# Patient Record
Sex: FEMALE | Race: WHITE | NOT HISPANIC OR LATINO | Employment: OTHER | URBAN - METROPOLITAN AREA
[De-identification: names, ages, dates, MRNs, and addresses within clinical notes are randomized per-mention and may not be internally consistent; named-entity substitution may affect disease eponyms.]

---

## 2023-06-08 LAB
7-AMINOCLONAZEPAM: <25 NG/ML
ALPHA-HYDROXYALPRAZOLAM: <25 NG/ML
ALPHA-HYDROXYMIDAZOLAM: <25 NG/ML
ALPRAZOLAM: <25 NG/ML
CHLORDIAZEPOXIDE: <25 NG/ML
CLONAZEPAM: <25 NG/ML
DIAZEPAM: <25 NG/ML
LORAZEPAM: 63 NG/ML
MIDAZOLAM: <25 NG/ML
NORDIAZEPAM: <25 NG/ML
OXAZEPAM: <25 NG/ML
TEMAZEPAM: <25 NG/ML

## 2023-11-20 DIAGNOSIS — F32.3: ICD-10-CM

## 2023-11-20 PROBLEM — H26.003: Status: ACTIVE | Noted: 2018-02-28

## 2023-11-20 PROBLEM — R62.50 DEVELOPMENTAL REGRESSION: Status: ACTIVE | Noted: 2023-01-03

## 2023-11-20 PROBLEM — D75.89 MACROCYTOSIS: Status: ACTIVE | Noted: 2020-02-14

## 2023-11-20 PROBLEM — K59.00 CONSTIPATION: Status: ACTIVE | Noted: 2020-01-09

## 2023-11-20 PROBLEM — E66.811 OBESITY, CLASS I, BMI 30-34.9: Status: ACTIVE | Noted: 2018-05-02

## 2023-11-20 PROBLEM — Q90.9 DOWN SYNDROME, UNSPECIFIED (HHS-HCC): Status: ACTIVE | Noted: 2018-02-27

## 2023-11-20 PROBLEM — N91.1 SECONDARY AMENORRHEA: Status: ACTIVE | Noted: 2023-05-10

## 2023-11-20 PROBLEM — D75.839 THROMBOCYTOSIS: Status: ACTIVE | Noted: 2020-02-14

## 2023-11-20 PROBLEM — F70 MILD INTELLECTUAL DISABILITY: Status: ACTIVE | Noted: 2020-01-09

## 2023-11-20 PROBLEM — E55.9 VITAMIN D DEFICIENCY: Status: ACTIVE | Noted: 2020-01-09

## 2023-11-20 PROBLEM — E66.9 OBESITY, CLASS I, BMI 30-34.9: Status: ACTIVE | Noted: 2018-05-02

## 2023-11-20 PROBLEM — E03.9 ACQUIRED HYPOTHYROIDISM: Status: ACTIVE | Noted: 2021-04-20

## 2023-11-20 RX ORDER — LORAZEPAM 0.5 MG/1
0.5 TABLET ORAL NIGHTLY
Qty: 30 TABLET | Refills: 1 | Status: SHIPPED | OUTPATIENT
Start: 2023-11-20 | End: 2023-12-21 | Stop reason: SDUPTHER

## 2023-11-20 RX ORDER — LEVONORGESTREL AND ETHINYL ESTRADIOL 0.15-0.03
1 KIT ORAL
COMMUNITY
Start: 2023-09-19

## 2023-11-20 RX ORDER — ATORVASTATIN CALCIUM 40 MG/1
40 TABLET, FILM COATED ORAL
COMMUNITY
Start: 2023-10-30

## 2023-11-20 RX ORDER — LORAZEPAM 0.5 MG/1
1 TABLET ORAL DAILY PRN
COMMUNITY
Start: 2019-04-16 | End: 2023-11-20 | Stop reason: SDUPTHER

## 2023-11-20 RX ORDER — DULOXETIN HYDROCHLORIDE 30 MG/1
30 CAPSULE, DELAYED RELEASE ORAL NIGHTLY
Qty: 30 CAPSULE | Refills: 1 | Status: SHIPPED | OUTPATIENT
Start: 2023-11-20 | End: 2023-12-21 | Stop reason: SDUPTHER

## 2023-11-20 RX ORDER — METFORMIN HYDROCHLORIDE 500 MG/1
500 TABLET ORAL
COMMUNITY

## 2023-11-20 RX ORDER — LEVOTHYROXINE SODIUM 100 UG/1
100 TABLET ORAL
COMMUNITY
Start: 2020-11-20

## 2023-11-20 RX ORDER — LURASIDONE HYDROCHLORIDE 40 MG/1
40 TABLET, FILM COATED ORAL NIGHTLY
Qty: 30 TABLET | Refills: 1 | Status: SHIPPED | OUTPATIENT
Start: 2023-11-20 | End: 2023-12-21 | Stop reason: SDUPTHER

## 2023-11-20 RX ORDER — DULOXETIN HYDROCHLORIDE 30 MG/1
30 CAPSULE, DELAYED RELEASE ORAL DAILY
COMMUNITY
End: 2023-11-20 | Stop reason: SDUPTHER

## 2023-11-20 RX ORDER — CEPHALEXIN 500 MG/1
500 CAPSULE ORAL 2 TIMES DAILY
COMMUNITY
Start: 2023-11-20 | End: 2023-11-25

## 2023-11-20 RX ORDER — DULOXETIN HYDROCHLORIDE 60 MG/1
60 CAPSULE, DELAYED RELEASE ORAL DAILY
COMMUNITY
End: 2023-11-20 | Stop reason: SDUPTHER

## 2023-11-20 RX ORDER — LURASIDONE HYDROCHLORIDE 40 MG/1
40 TABLET, FILM COATED ORAL NIGHTLY
COMMUNITY
Start: 2022-12-09 | End: 2023-11-20 | Stop reason: SDUPTHER

## 2023-11-20 RX ORDER — DULOXETIN HYDROCHLORIDE 60 MG/1
60 CAPSULE, DELAYED RELEASE ORAL NIGHTLY
Qty: 30 CAPSULE | Refills: 1 | Status: SHIPPED | OUTPATIENT
Start: 2023-11-20 | End: 2023-12-21 | Stop reason: SDUPTHER

## 2023-11-20 RX ORDER — ASPIRIN 81 MG/1
1 TABLET ORAL NIGHTLY
COMMUNITY
Start: 2017-07-19

## 2023-12-21 ENCOUNTER — TELEMEDICINE (OUTPATIENT)
Dept: BEHAVIORAL HEALTH | Facility: CLINIC | Age: 38
End: 2023-12-21
Payer: MEDICARE

## 2023-12-21 DIAGNOSIS — R62.50 DEVELOPMENTAL REGRESSION: ICD-10-CM

## 2023-12-21 DIAGNOSIS — F70 MILD INTELLECTUAL DISABILITY: ICD-10-CM

## 2023-12-21 DIAGNOSIS — F39 MOOD DISORDER (CMS-HCC): Primary | ICD-10-CM

## 2023-12-21 DIAGNOSIS — Q90.9 DOWN SYNDROME, UNSPECIFIED (HHS-HCC): ICD-10-CM

## 2023-12-21 PROCEDURE — 99215 OFFICE O/P EST HI 40 MIN: CPT | Performed by: PSYCHIATRY & NEUROLOGY

## 2023-12-21 RX ORDER — LURASIDONE HYDROCHLORIDE 40 MG/1
40 TABLET, FILM COATED ORAL NIGHTLY
Qty: 30 TABLET | Refills: 3 | Status: SHIPPED | OUTPATIENT
Start: 2023-12-21 | End: 2024-04-11 | Stop reason: SDUPTHER

## 2023-12-21 RX ORDER — DULOXETIN HYDROCHLORIDE 60 MG/1
60 CAPSULE, DELAYED RELEASE ORAL NIGHTLY
Qty: 30 CAPSULE | Refills: 3 | Status: SHIPPED | OUTPATIENT
Start: 2023-12-21 | End: 2024-04-11 | Stop reason: SDUPTHER

## 2023-12-21 RX ORDER — LORAZEPAM 0.5 MG/1
0.5 TABLET ORAL NIGHTLY
Qty: 30 TABLET | Refills: 2 | Status: SHIPPED | OUTPATIENT
Start: 2023-12-21 | End: 2024-04-04 | Stop reason: SDUPTHER

## 2023-12-21 RX ORDER — DULOXETIN HYDROCHLORIDE 30 MG/1
30 CAPSULE, DELAYED RELEASE ORAL EVERY MORNING
Qty: 30 CAPSULE | Refills: 3 | Status: SHIPPED | OUTPATIENT
Start: 2023-12-21 | End: 2024-04-11 | Stop reason: SDUPTHER

## 2023-12-21 NOTE — PROGRESS NOTES
Outpatient Psychiatry    A HIPAA-compliant interactive audio and video telecommunication system which permits real time communications between the patient (at the originating site) and provider (at the distant site) was utilized to provide this telehealth service.     The patient, family, caregivers and guardian (as appropriate) have provided consent on this date to conduct treatment via this telehealth service.  The patient's identity and physical location were verified at the time of this visit.      Present for appointment: Jessica and caregiver (Jonathon).    SUBJECTIVE    Last visit with me was 9/07/23.  Saw PCP for Medicare AWV on 11/20/23 -- had labs done.  Referred back to Heme/Onc for persisting leukocytosis and thrombocytosis (has appointment 1/02/24).    Jonathon reports some increased problems with moodiness and oppositional behaviors over the past month or so.  She has been more resistant to doing anything at the day program even though she seems to really enjoy going there.  She is often grumpy and agitated in the afternoons regardless of whether or not she goes to day program; Jonathon says mood seems best first thing in the morning.  Sometimes she is sad/tearful, but mostly she is irritable and quick to get upset.  Rarely using any extra as-needed lorazepam during the daytime aside from the standing/scheduled dose she receives every night.  She is also just getting over recent COVID illness, but these mood/behavior changes predated the illness.  She still enjoys going bowling.  They still have an independent provider for a few hours in the afternoons Monday through Thursday.  She still sees mom with some regularity.      Jonathon does not perceive any additional problems with cognitive or functional decline or memory impairment over the past three months.    She does not appear to be internally stimulated or interacting with hallucinations.     She is sleeping very well at night.  She does not appear excessively tired  "during the daytime.      Her weight is pretty stable.  She is often resistant to participating in physical activity.  Jonathon says Jessica is \"highly motivated\" by food and that offering this as a reward is sometimes the only way he can get her to do things.  Still has some occasional problems with bowel accidents versus poor hygiene or clean-up.  She is not taking any laxatives or stool softeners at this time.  She is not having problems with urinary accidents.     She does not seem to have any involuntary movements.  She has a long-standing behavior (which predates use of antipsychotic medications) of repetitively sticking out her tongue which appears voluntary and has not changed from past visits.  She has no tremors or muscle stiffness.  Jonathon reports zero episodes in the past several months of Jessica having any dystonic eye movements or gaze abnormalities.  She does not have any drooling or dysphagia.      She has not had any dizziness, falls, or other gait problems.  She has not had any apparent seizures or spells.     She does not seem to have any vision or hearing impairments.     She does not report any problems with pain, though Jonathon says she rarely ever would complain of pain even if severe.     Review of Systems   Constitutional:  Negative for activity change and fatigue.   HENT:  Negative for drooling and trouble swallowing.    Respiratory:  Negative for cough and choking.    Gastrointestinal:  Negative for blood in stool and constipation.   Genitourinary:  Negative for menstrual problem (suppressed with OCPs).   Musculoskeletal:  Negative for gait problem.   Neurological:  Negative for seizures and syncope.   Psychiatric/Behavioral:  Positive for agitation, behavioral problems and dysphoric mood. Negative for sleep disturbance.       Controlled Substance Evaluation  OARRS/PDMP reviewed: Jean Claude Patel MD on 12/21/2023  6:58 AM  Is the patient prescribed a combination of a benzodiazepine and opioid? " No  I have personally reviewed the OARRS report for Jessica Shrestha.   I have considered the risks of abuse, dependence, addiction and diversion.    I believe that it is clinically appropriate for Jessica Shrestha to be prescribed this medication.    Last Urine Drug Screen:  Recent Results (from the past 8760 hour(s))   Benzodiazepine Confirmation, Urine    Collection Time: 06/01/23  7:46 AM   Result Value Ref Range    7-Aminoclonazepam <25 Cutoff <25 ng/mL    Alpha-Hydroxyalprazolam <25 Cutoff <25 ng/mL    Alpha-Hydroxymidazolam <25 Cutoff <25 ng/mL    Alprazolam <25 Cutoff <25 ng/mL    Chlordiazepoxide <25 Cutoff <25 ng/mL    Clonazepam <25 Cutoff <25 ng/mL    Diazepam <25 Cutoff <25 ng/mL    Lorazepam 63 (A) Cutoff <25 ng/mL    Midazolam <25 Cutoff <25 ng/mL    Nordiazepam <25 Cutoff <25 ng/mL    Oxazepam <25 Cutoff <25 ng/mL    Temazepam <25 Cutoff <25 ng/mL     Results as expected? Yes    Controlled Substance Agreement: 06/01/2023  Reviewed Controlled Substance Agreement, including but not limited to:  Benefits, risks, and alternatives to treatment with a controlled substance medication(s).    Prescribed Controlled Substances:  Benzodiazepines: Lorazepam  What is the patient's goal of therapy? Improved sleep/mood  Is this being achieved with current treatment? Yes  Activities of Daily Living:   Is your overall impression that this patient is benefiting (symptom reduction outweighs side effects) from benzodiazepine therapy? Yes   1. Physical Functioning: Same  2. Family Relationship: Same  3. Social Relationship: Same  4. Mood: Worse  5. Sleep Patterns: Same  6. Overall Function: Same     MEDICATION HISTORY  Aripiprazole - weight gain  Clonazepam - worse response, too sedating  Fluoxetine - some improvement in mood  Trintellix - minimal benefit  Ziprasidone - too sedating    CURRENT MEDICATIONS    Current Outpatient Medications:     aspirin 81 mg EC tablet, Take 1 tablet (81 mg) by mouth once daily., Disp: ,  Rfl:     atorvastatin (Lipitor) 40 mg tablet, Take 1 tablet (40 mg) by mouth once daily., Disp: , Rfl:     DULoxetine (Cymbalta) 30 mg DR capsule, Take 1 capsule (30 mg) by mouth once daily at bedtime., Disp: 30 capsule, Rfl: 1    DULoxetine (Cymbalta) 60 mg DR capsule, Take 1 capsule (60 mg) by mouth once daily at bedtime., Disp: 30 capsule, Rfl: 1    levonorgestreL-ethinyl estrad (Seasonale) 0.15 mg-30 mcg (91) tablet, Take 1 tablet by mouth once daily., Disp: , Rfl:     levothyroxine (Synthroid, Levoxyl) 100 mcg tablet, Take 1 tablet (100 mcg) by mouth once daily., Disp: , Rfl:     LORazepam (Ativan) 0.5 mg tablet, Take 1 tablet (0.5 mg) by mouth once daily at bedtime., Disp: 30 tablet, Rfl: 1    lurasidone (Latuda) 40 mg tablet, Take 1 tablet (40 mg) by mouth once daily at bedtime., Disp: 30 tablet, Rfl: 1    metFORMIN (Glucophage) 500 mg tablet, Take 1 tablet (500 mg) by mouth 2 times a day with meals., Disp: , Rfl:     SOCIAL HISTORY  Living situation Lives with her caregiver and twin sister   Provider agency N/A   Work or day program [employer] 5 days/week   School N/A   Guardianship Self currently   [facility]   Bx Specialist ?   Nicotine None   Alcohol None   Other drugs None     OBJECTIVE    Lab Results   Component Value Date    HGBA1C 6.3 (H) 11/24/2023     Therapeutic Drug Monitoring  N/A    Electrocardiograms  03/13/2017: NSR, VR 83, QTc 394    MENTAL STATUS EXAM  General/Appearance: Appropriate dress/hygiene/grooming. Dysmorphic features. Macroglossia.  Attitude/Behavior: Difficult to engage. Hostile. Doesn't want to sit with caregiver, pushes him away. Protrudes tongue.  Speech/Communication: Single word responses. Often difficult to get answers to questions.  Motor: Fidgets. No abnormal or involuntary movements observed.  Gait: Not assessed at this visit.  Mood: Appears upset/irritable.  Affect: Congruent. Irritable.  Thought processes:  Goal-directed.  Thought content: Unable to assess.  Perception: Does not appear to be experiencing or responding to hallucinations.  Sensorium/Cognition: Oriented to self and surroundings. Intellectual impairment. Minimal interest in participating.  Memory: Not directly assessed at this time.  Insight: Absent.  Judgment: Poor.    ASSESSMENT  Some increase in moodiness over the past few months.  Difficult to tell how much is psychological versus behavioral; does not appear that there are any obvious medical problems occurring at this time.  We reviewed KARALIT pharmacogenomic testing which indicates she may be rapidly metabolizing duloxetine, so we will try splitting up the dosing to see if this helps sustain a more consistent mood across the day; may consider increasing up to 60mg twice daily as a next step.    PROBLEM LIST  Intellectual developmental disorder, mild, secondary to Down syndrome  Mood disorder versus DSRD    PLAN  -- Change duloxetine to 30mg QAM and 60mg QHS for depression  -- Continue Latuda 40mg QHS for depression and psychosis  -- Continue lorazepam 0.5mg QHS for anxiety and irritability   -- PRN: lorazepam 0.5mg once daily for agitation  -- Follow up 3 months    Jean Claude Patel MD    Prep time on date of the patient encounter: 5 minutes   Time spent directly with patient/family/caregiver: 45 minutes   Additional time spent on patient care activities: 0 minutes   Documentation time: 10 minutes   Other time spent: 0 minutes   Total time on date of patient encounter: 60 minutes

## 2023-12-22 ASSESSMENT — ENCOUNTER SYMPTOMS
FATIGUE: 0
CONSTIPATION: 0
CHOKING: 0
SEIZURES: 0
COUGH: 0
ACTIVITY CHANGE: 0
BLOOD IN STOOL: 0
AGITATION: 1
SLEEP DISTURBANCE: 0
DYSPHORIC MOOD: 1
TROUBLE SWALLOWING: 0

## 2023-12-22 ASSESSMENT — LIFESTYLE VARIABLES
SKIP TO QUESTIONS 9-10: 1
AUDIT-C TOTAL SCORE: 0
HOW OFTEN DO YOU HAVE A DRINK CONTAINING ALCOHOL: NEVER
HOW OFTEN DO YOU HAVE SIX OR MORE DRINKS ON ONE OCCASION: NEVER
HOW MANY STANDARD DRINKS CONTAINING ALCOHOL DO YOU HAVE ON A TYPICAL DAY: PATIENT DOES NOT DRINK

## 2024-04-04 ENCOUNTER — TELEPHONE (OUTPATIENT)
Dept: BEHAVIORAL HEALTH | Facility: CLINIC | Age: 39
End: 2024-04-04
Payer: MEDICARE

## 2024-04-04 DIAGNOSIS — F39 MOOD DISORDER (CMS-HCC): ICD-10-CM

## 2024-04-04 RX ORDER — LORAZEPAM 0.5 MG/1
0.5 TABLET ORAL NIGHTLY
Qty: 28 TABLET | Refills: 0 | Status: SHIPPED | OUTPATIENT
Start: 2024-04-04 | End: 2024-04-11 | Stop reason: SDUPTHER

## 2024-04-11 ENCOUNTER — OFFICE VISIT (OUTPATIENT)
Dept: BEHAVIORAL HEALTH | Facility: CLINIC | Age: 39
End: 2024-04-11
Payer: MEDICARE

## 2024-04-11 VITALS
WEIGHT: 159.4 LBS | TEMPERATURE: 98 F | SYSTOLIC BLOOD PRESSURE: 113 MMHG | DIASTOLIC BLOOD PRESSURE: 79 MMHG | RESPIRATION RATE: 18 BRPM | HEART RATE: 118 BPM

## 2024-04-11 DIAGNOSIS — F39 MOOD DISORDER (CMS-HCC): Primary | ICD-10-CM

## 2024-04-11 DIAGNOSIS — Q90.9 DOWN SYNDROME, UNSPECIFIED (HHS-HCC): ICD-10-CM

## 2024-04-11 DIAGNOSIS — R62.50 DEVELOPMENTAL REGRESSION: ICD-10-CM

## 2024-04-11 DIAGNOSIS — F70 MILD INTELLECTUAL DISABILITY: ICD-10-CM

## 2024-04-11 DIAGNOSIS — Z79.899 HIGH RISK MEDICATION USE: ICD-10-CM

## 2024-04-11 PROCEDURE — 1036F TOBACCO NON-USER: CPT | Performed by: PSYCHIATRY & NEUROLOGY

## 2024-04-11 PROCEDURE — 99215 OFFICE O/P EST HI 40 MIN: CPT | Performed by: PSYCHIATRY & NEUROLOGY

## 2024-04-11 RX ORDER — DULOXETIN HYDROCHLORIDE 30 MG/1
30 CAPSULE, DELAYED RELEASE ORAL EVERY MORNING
Qty: 30 CAPSULE | Refills: 3 | Status: SHIPPED | OUTPATIENT
Start: 2024-04-11

## 2024-04-11 RX ORDER — DULOXETIN HYDROCHLORIDE 60 MG/1
60 CAPSULE, DELAYED RELEASE ORAL NIGHTLY
Qty: 30 CAPSULE | Refills: 3 | Status: SHIPPED | OUTPATIENT
Start: 2024-04-11

## 2024-04-11 RX ORDER — LURASIDONE HYDROCHLORIDE 40 MG/1
40 TABLET, FILM COATED ORAL NIGHTLY
Qty: 30 TABLET | Refills: 3 | Status: SHIPPED | OUTPATIENT
Start: 2024-04-11

## 2024-04-11 RX ORDER — LORAZEPAM 0.5 MG/1
0.5 TABLET ORAL NIGHTLY
Qty: 30 TABLET | Refills: 2 | Status: SHIPPED | OUTPATIENT
Start: 2024-04-11

## 2024-04-11 ASSESSMENT — ENCOUNTER SYMPTOMS
SLEEP DISTURBANCE: 0
CONSTIPATION: 0
ACTIVITY CHANGE: 0
BLOOD IN STOOL: 0
HALLUCINATIONS: 0
COUGH: 0
TROUBLE SWALLOWING: 0
CHOKING: 0
SEIZURES: 0

## 2024-04-11 ASSESSMENT — PAIN SCALES - GENERAL: PAINLEVEL: 0-NO PAIN

## 2024-04-11 NOTE — PROGRESS NOTES
"Outpatient Psychiatry    Present for appointment: Jessica and caregiver (Jonathon).    SUBJECTIVE    No significant health issues for Jessica since our last appointment in December.  No treatment changes reported by caregiver.    She had follow-up with Hematology/Oncology and no clear cause for her leukocytosis was identified.  They will continue to monitor with periodic labs, but no additional testing or treatment is recommended right now.    Overall, her mood has been fairly stable.  She still gets upset/irritable/oppositional at times.  She does not seem to be sad or withdrawn.  She has generally been doing well at the day program.    She enjoys bowling on Saturdays.  She likes to go to the park to swing.    Jonathon reports he is rarely using any extra as-needed lorazepam during the daytime, perhaps only once or twice a month.  He only uses this if she has an intense episode of moodiness or crying.     Jonathon does not perceive any additional problems with cognitive or functional decline or memory impairment over the past three months.    She does not appear to be internally stimulated or interacting with hallucinations.     Sleeps \"great\" through the night.    She was having some loose stools and bowel accidents, which have improved a bit since drastically reducing lactose in her diet.    Her weight is pretty stable.  She is often resistant to participating in physical activity.       She does not seem to have any involuntary movements.  She has a long-standing behavior (which predates use of antipsychotic medications) of repetitively sticking out her tongue which appears voluntary and has not changed from past visits.  She has no tremors or muscle stiffness.  Jonathon reports no episodes of Jessica having any dystonic eye movements or gaze abnormalities.  She does not have any drooling or dysphagia.      She has not had any dizziness, falls, or other gait problems.  She has not had any apparent seizures or spells.     She does not " seem to have any vision or hearing impairments.     She does not report any problems with pain, though Jonathon says she rarely ever would complain of pain even if severe.     Review of Systems   Constitutional:  Negative for activity change.   HENT:  Negative for drooling and trouble swallowing.    Respiratory:  Negative for cough and choking.    Gastrointestinal:  Negative for blood in stool and constipation.   Genitourinary:  Negative for menstrual problem (suppressed with OCPs).   Musculoskeletal:  Negative for gait problem.   Neurological:  Negative for seizures and syncope.   Psychiatric/Behavioral:  Negative for hallucinations and sleep disturbance.      Psych Review of Symptoms:    ADHD:   Inattention Symptoms: Difficulty paying attention when spoken to.       Anxiety: Patient denied any symptoms.         Depressive Symptoms:   Irritable. No decreased interest.      Disruptive and Conduct Symptoms:   Defiant.       Eating / Feeding Concerns: Patient denied any symptoms.         Elimination Symptoms:   Underwear staining. No constipation.      Manic Symptoms: Patient denied any symptoms.         Obsessive-Compulsive Symptoms: Patient denied any symptoms.         Psychotic Symptoms:   No disorganization and no hallucinations.        Sleep Concerns: Patient denied any symptoms.          Controlled Substance Evaluation  OARRS/PDMP reviewed: Jean Claude Patel MD on 4/11/2024  6:45 AM  Is the patient prescribed a combination of a benzodiazepine and opioid? No  I have personally reviewed the OARRS report for Jessica Shrestha.   I have considered the risks of abuse, dependence, addiction and diversion.    I believe that it is clinically appropriate for Jessica Shrestha to be prescribed this medication.    Last Urine Drug Screen:  Recent Results (from the past 8760 hour(s))   Benzodiazepine Confirmation, Urine    Collection Time: 06/01/23  7:46 AM   Result Value Ref Range    7-Aminoclonazepam <25 Cutoff <25 ng/mL     Alpha-Hydroxyalprazolam <25 Cutoff <25 ng/mL    Alpha-Hydroxymidazolam <25 Cutoff <25 ng/mL    Alprazolam <25 Cutoff <25 ng/mL    Chlordiazepoxide <25 Cutoff <25 ng/mL    Clonazepam <25 Cutoff <25 ng/mL    Diazepam <25 Cutoff <25 ng/mL    Lorazepam 63 (A) Cutoff <25 ng/mL    Midazolam <25 Cutoff <25 ng/mL    Nordiazepam <25 Cutoff <25 ng/mL    Oxazepam <25 Cutoff <25 ng/mL    Temazepam <25 Cutoff <25 ng/mL     Results as expected? Yes    Controlled Substance Agreement: 06/01/2023  Reviewed Controlled Substance Agreement, including but not limited to:  Benefits, risks, and alternatives to treatment with a controlled substance medication(s).    Prescribed Controlled Substances:  -- Benzodiazepines: Lorazepam  What is the patient's goal of therapy? Improved sleep/mood  Is this being achieved with current treatment? Yes  Activities of Daily Living:   Is your overall impression that this patient is benefiting (symptom reduction outweighs side effects) from benzodiazepine therapy? Yes   1. Physical Functioning: Same  2. Family Relationship: Same  3. Social Relationship: Same  4. Mood: Worse  5. Sleep Patterns: Same  6. Overall Function: Same     MEDICATION HISTORY  Aripiprazole - weight gain  Clonazepam - worse response, too sedating  Fluoxetine - some improvement in mood  Trintellix - minimal benefit  Ziprasidone - too sedating    CURRENT MEDICATIONS    Current Outpatient Medications:     aspirin 81 mg EC tablet, Take 1 tablet (81 mg) by mouth once daily at bedtime., Disp: , Rfl:     atorvastatin (Lipitor) 40 mg tablet, Take 1 tablet (40 mg) by mouth once daily., Disp: , Rfl:     DULoxetine (Cymbalta) 30 mg DR capsule, Take 1 capsule (30 mg) by mouth once daily in the morning., Disp: 30 capsule, Rfl: 3    DULoxetine (Cymbalta) 60 mg DR capsule, Take 1 capsule (60 mg) by mouth once daily at bedtime., Disp: 30 capsule, Rfl: 3    levonorgestreL-ethinyl estrad (Seasonale) 0.15 mg-30 mcg (91) tablet, Take 1 tablet by mouth  once daily., Disp: , Rfl:     levothyroxine (Synthroid, Levoxyl) 100 mcg tablet, Take 1 tablet (100 mcg) by mouth once daily., Disp: , Rfl:     LORazepam (Ativan) 0.5 mg tablet, Take 1 tablet (0.5 mg) by mouth once daily at bedtime., Disp: 28 tablet, Rfl: 0    lurasidone (Latuda) 40 mg tablet, Take 1 tablet (40 mg) by mouth once daily at bedtime., Disp: 30 tablet, Rfl: 3    metFORMIN (Glucophage) 500 mg tablet, Take 1 tablet (500 mg) by mouth 2 times a day with meals., Disp: , Rfl:     SOCIAL HISTORY  Living situation Lives with her caregiver and twin sister   Provider agency N/A   Work or day program [facility] [employer] 5 days/week   School N/A   Guardianship Self currently   [facility] Specialist ?   Nicotine None   Alcohol None   Other drugs None     OBJECTIVE    Visit Vitals  /79 (BP Location: Right arm, Patient Position: Sitting, BP Cuff Size: Adult)   Pulse (!) 118   Temp 36.7 °C (98 °F) (Temporal)   Resp 18   Wt 72.3 kg (159 lb 6.4 oz)   Smoking Status Never      Lab Results   Component Value Date    HGBA1C 6.3 (H) 11/24/2023     Therapeutic Drug Monitoring  N/A    Electrocardiograms  03/13/2017: NSR, VR 83, QTc 394    MENTAL STATUS EXAM  General/Appearance: Appropriate dress/hygiene/grooming. Dysmorphic features. Macroglossia.  Attitude/Behavior: Average eye contact. Superficially cooperative. Draws/colors a picture.  Speech/Communication: Single word responses. Often difficult to get answers to questions. Somewhat more talkative today.  Motor: Fidgets. No abnormal or involuntary movements observed. Bounces in her seat as if dancing to a song.  Gait: Ambulates w/o difficulty.  Mood: Calm/pleasant.  Affect: Congruent. Euthymic.  Thought processes: Goal-directed. Talks about seeing mom this weekend.  Thought content: Unable to assess.  Perception: Does not appear to be experiencing or responding to hallucinations.  Sensorium/Cognition: Oriented to self and  surroundings. Intellectual impairment. Minimal interest in participating.  Memory: Not directly assessed at this time.  Insight: Absent.  Judgment: Poor.    ASSESSMENT  Jessica seems to be doing well today.  I don't think her sporadic episodes of moodiness are related to any psychiatric problem, and appear to be more consistent with behavioral responses to having to do things she does not prefer.  We will continue with her current treatment regimen at this time.  Order provided to caregiver for yearly urine drug screening.    PROBLEM LIST  Intellectual developmental disorder, mild, secondary to Down syndrome  Mood disorder versus DSRD    PLAN  -- Continue duloxetine 30mg QAM and 60mg QHS for depression  -- Continue Latuda 40mg QHS for depression and psychosis  -- Continue lorazepam 0.5mg QHS for anxiety and irritability   -- PRN: lorazepam 0.5mg once daily for agitation  -- Follow up 3 months    Jean Claude Patel MD    Prep time on date of the patient encounter: 5 minutes   Time spent directly with patient/family/caregiver: 45 minutes   Additional time spent on patient care activities: 0 minutes   Documentation time: 10 minutes   Other time spent: 0 minutes   Total time on date of patient encounter: 60 minutes

## 2024-04-11 NOTE — PATIENT INSTRUCTIONS
Continue current medication regimen.    OK to give afternoon/evening dose of duloxetine (60 mg) and Latuda (40 mg) at ANY time during the day that is convenient for caregiver.    Next appointment: Thursday 07/11/2024 at 3:15 PM naeem.

## 2024-07-11 ENCOUNTER — APPOINTMENT (OUTPATIENT)
Dept: BEHAVIORAL HEALTH | Facility: CLINIC | Age: 39
End: 2024-07-11
Payer: MEDICARE

## 2024-07-11 DIAGNOSIS — F70 MILD INTELLECTUAL DISABILITY: ICD-10-CM

## 2024-07-11 DIAGNOSIS — R29.818 EXTRAPYRAMIDAL SYMPTOM: ICD-10-CM

## 2024-07-11 DIAGNOSIS — R62.50 DEVELOPMENTAL REGRESSION: ICD-10-CM

## 2024-07-11 DIAGNOSIS — F39 MOOD DISORDER (CMS-HCC): Primary | ICD-10-CM

## 2024-07-11 DIAGNOSIS — Q90.9 DOWN SYNDROME, UNSPECIFIED (HHS-HCC): ICD-10-CM

## 2024-07-11 PROBLEM — E11.9 DIABETES MELLITUS, NEW ONSET (MULTI): Status: ACTIVE | Noted: 2024-04-24

## 2024-07-11 PROCEDURE — 1036F TOBACCO NON-USER: CPT | Performed by: PSYCHIATRY & NEUROLOGY

## 2024-07-11 PROCEDURE — 99215 OFFICE O/P EST HI 40 MIN: CPT | Performed by: PSYCHIATRY & NEUROLOGY

## 2024-07-11 RX ORDER — LURASIDONE HYDROCHLORIDE 40 MG/1
40 TABLET, FILM COATED ORAL DAILY
Qty: 30 TABLET | Refills: 11 | Status: SHIPPED | OUTPATIENT
Start: 2024-07-11

## 2024-07-11 RX ORDER — ZINC GLUCONATE 50 MG
1000 TABLET ORAL DAILY
COMMUNITY

## 2024-07-11 RX ORDER — DULOXETIN HYDROCHLORIDE 30 MG/1
30 CAPSULE, DELAYED RELEASE ORAL DAILY
Qty: 30 CAPSULE | Refills: 11 | Status: SHIPPED | OUTPATIENT
Start: 2024-07-11

## 2024-07-11 RX ORDER — BENZTROPINE MESYLATE 0.5 MG/1
TABLET ORAL
Qty: 30 TABLET | Refills: 11 | Status: SHIPPED | OUTPATIENT
Start: 2024-07-11

## 2024-07-11 RX ORDER — LORAZEPAM 0.5 MG/1
0.5 TABLET ORAL NIGHTLY
Qty: 30 TABLET | Refills: 2 | Status: SHIPPED | OUTPATIENT
Start: 2024-07-11

## 2024-07-11 RX ORDER — DULOXETIN HYDROCHLORIDE 60 MG/1
60 CAPSULE, DELAYED RELEASE ORAL DAILY
Qty: 30 CAPSULE | Refills: 11 | Status: SHIPPED | OUTPATIENT
Start: 2024-07-11

## 2024-07-11 ASSESSMENT — ENCOUNTER SYMPTOMS
BLOOD IN STOOL: 0
TROUBLE SWALLOWING: 0
CHOKING: 0
CONSTIPATION: 0
COUGH: 0
SEIZURES: 0
HALLUCINATIONS: 0
SLEEP DISTURBANCE: 0
ACTIVITY CHANGE: 0

## 2024-07-11 NOTE — PROGRESS NOTES
"Outpatient Adult IDD Psychiatry    A HIPAA-compliant interactive audio and video telecommunication system which permits real time communications between the patient (at the originating site) and provider (at the distant site) was utilized to provide this telehealth service.     The patient, family, caregivers and guardian (as appropriate) have provided consent to conduct treatment via this telehealth service.  The patient's identity and physical location were verified at the time of this visit.      Present for appointment: Jessica and caregiver (Jonathon).    SUBJECTIVE    No significant health issues for Jessica since our last appointment    Overall, her mood has been fairly stable.  She still gets upset/irritable/oppositional at times.  She does not seem to be sad or withdrawn.  She has generally been doing well at the day program.    Jonathon does not perceive any additional problems with cognitive or functional decline or memory impairment over the past few months.    She does not appear to be internally stimulated or interacting with hallucinations.     Sleeps \"great\" through the night.    Stopped giving metformin due to GI upset and loose stools.  No weight changes or problems with diarrhea.    Over the past few weeks she has been having an increase (or recurrence) of episodes of abnormal and apparently involuntary eye movements concerning for dystonia.  These tend to happen most frequently later in the afternoon/evening.  Jonathon says they sometimes improve by giving a PRN dose of lorazepam.    She does not have any drooling or dysphagia.      She has not had any dizziness, falls, or other gait problems.  She has not had any apparent seizures or spells.     Review of Systems   Constitutional:  Negative for activity change.   HENT:  Negative for drooling and trouble swallowing.    Respiratory:  Negative for cough and choking.    Gastrointestinal:  Negative for blood in stool and constipation.   Musculoskeletal:  Negative " for gait problem.   Neurological:  Negative for seizures and syncope.   Psychiatric/Behavioral:  Negative for hallucinations and sleep disturbance.       Controlled Substance Evaluation  OARRS/PDMP reviewed: Jean Claude Patel MD on 7/11/2024  7:23 AM  Is the patient prescribed a combination of a benzodiazepine and opioid? No  I have personally reviewed the OARRS report for Jessica Shrestha.   I have considered the risks of abuse, dependence, addiction and diversion.    I believe that it is clinically appropriate for Jessica Shrestha to be prescribed this medication.    Last Urine Drug Screen: 06/01/2023  No results found for this or any previous visit (from the past 8760 hour(s)).    Controlled Substance Agreement: 04/11/2024  Reviewed Controlled Substance Agreement, including but not limited to:  Benefits, risks, and alternatives to treatment with a controlled substance medication(s).    Prescribed Controlled Substances:  > Benzodiazepines: Lorazepam  What is the patient's goal of therapy? Improved sleep/mood  Is this being achieved with current treatment? Yes  Activities of Daily Living:   Is your overall impression that this patient is benefiting (symptom reduction outweighs side effects) from benzodiazepine therapy? Yes   1. Physical Functioning: Same  2. Family Relationship: Same  3. Social Relationship: Same  4. Mood: Same  5. Sleep Patterns: Same  6. Overall Function: Same     MEDICATION HISTORY  Aripiprazole - weight gain  Clonazepam - worse response, too sedating  Fluoxetine - some improvement in mood  Trintellix - minimal benefit  Ziprasidone - too sedating    CURRENT MEDICATIONS    Current Outpatient Medications:     aspirin 81 mg EC tablet, Take 1 tablet (81 mg) by mouth once daily at bedtime., Disp: , Rfl:     atorvastatin (Lipitor) 40 mg tablet, Take 1 tablet (40 mg) by mouth once daily., Disp: , Rfl:     DULoxetine (Cymbalta) 30 mg DR capsule, Take 1 capsule (30 mg) by mouth once daily in the  morning., Disp: 30 capsule, Rfl: 3    DULoxetine (Cymbalta) 60 mg DR capsule, Take 1 capsule (60 mg) by mouth once daily at bedtime., Disp: 30 capsule, Rfl: 3    levonorgestreL-ethinyl estrad (Seasonale) 0.15 mg-30 mcg (91) tablet, Take 1 tablet by mouth once daily., Disp: , Rfl:     levothyroxine (Synthroid, Levoxyl) 100 mcg tablet, Take 1 tablet (100 mcg) by mouth once daily., Disp: , Rfl:     LORazepam (Ativan) 0.5 mg tablet, Take 1 tablet (0.5 mg) by mouth once daily at bedtime., Disp: 30 tablet, Rfl: 2    lurasidone (Latuda) 40 mg tablet, Take 1 tablet (40 mg) by mouth once daily at bedtime., Disp: 30 tablet, Rfl: 3    Vitamin B-12 1,000 mcg tablet, Take 1 tablet (1,000 mcg) by mouth once daily., Disp: , Rfl:     SOCIAL HISTORY  Living situation Lives with her caregiver and twin sister   Provider agency N/A   Work or day program [facility] [email or web address] 5 days/week   School N/A   Guardianship Self currently   [facility]   Bx Specialist ?   Nicotine None   Alcohol None   Other drugs None     OBJECTIVE    Lab Results   Component Value Date    HGBA1C 6.3 (H) 11/24/2023     Therapeutic Drug Monitoring  N/A    Electrocardiograms  03/13/2017: NSR, VR 83, QTc 394    MENTAL STATUS EXAM  General/Appearance: Appropriate dress/hygiene/grooming. Dysmorphic features.  Attitude/Behavior: Average eye contact. Superficially cooperative. Leaves the table after a minute or two.  Speech/Communication: Single word responses.  Motor: No abnormal or involuntary movements observed.  Gait: Ambulates w/o difficulty.  Mood: Calm/pleasant.  Affect: Congruent. Euthymic.  Thought processes: Goal-directed.  Thought content: Unable to assess.  Perception: Does not appear to be experiencing or responding to hallucinations.  Sensorium/Cognition: Oriented to self and surroundings. Intellectual impairment. Minimal interest in participating.  Memory: Not directly assessed at this time.  Insight: Absent.  Judgment:  Poor.    ASSESSMENT  Behaviorally doing well.  Possible recurrence of EPS (dystonia) -- start daily anticholinergic.    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    PLAN  -- Continue duloxetine 90mg daily in the afternoon for depression  -- Continue Latuda 40mg daily in the afternoon for depression and psychosis  -- Start benztropine 0.5mg daily in the afternoon for EPS  -- Continue lorazepam 0.5mg at bedtime for anxiety and irritability   -- PRN: lorazepam 0.5mg once daily for agitation  -- Follow up 2 months    Jean Claude Patel MD    Prep time on date of the patient encounter: 5 minutes   Time spent directly with patient/family/caregiver: 30 minutes   Additional time spent on patient care activities: 0 minutes   Documentation time: 5 minutes   Other time spent: 0 minutes   Total time on date of patient encounter: 40 minutes

## 2024-07-11 NOTE — PATIENT INSTRUCTIONS
Start daily benztropine 0.5mg with Latuda for EPS.  Next appointment: Thursday 10/03/2024 at 3:15 PM virtual.

## 2024-08-05 NOTE — PROGRESS NOTES
Developed new facial rash in the past 2 weeks since starting benztropine.  Recommend stopping benztropine to see if rash improves.  If scheduled anticholinergic is still needed we can try Artane.

## 2024-10-03 ENCOUNTER — APPOINTMENT (OUTPATIENT)
Dept: BEHAVIORAL HEALTH | Facility: CLINIC | Age: 39
End: 2024-10-03
Payer: MEDICARE

## 2024-10-03 DIAGNOSIS — F39 MOOD DISORDER (CMS-HCC): Primary | ICD-10-CM

## 2024-10-03 DIAGNOSIS — F70 MILD INTELLECTUAL DISABILITY: ICD-10-CM

## 2024-10-03 DIAGNOSIS — Q90.9 DOWN SYNDROME, UNSPECIFIED (HHS-HCC): ICD-10-CM

## 2024-10-03 DIAGNOSIS — R62.50 DEVELOPMENTAL REGRESSION: ICD-10-CM

## 2024-10-03 PROCEDURE — 99214 OFFICE O/P EST MOD 30 MIN: CPT | Performed by: PSYCHIATRY & NEUROLOGY

## 2024-10-03 PROCEDURE — 1036F TOBACCO NON-USER: CPT | Performed by: PSYCHIATRY & NEUROLOGY

## 2024-10-03 RX ORDER — METFORMIN HYDROCHLORIDE 500 MG/1
500 TABLET ORAL
COMMUNITY
Start: 2024-09-12

## 2024-10-03 RX ORDER — LORAZEPAM 0.5 MG/1
0.5 TABLET ORAL NIGHTLY
Qty: 30 TABLET | Refills: 2 | Status: SHIPPED | OUTPATIENT
Start: 2024-10-03

## 2024-10-03 ASSESSMENT — ENCOUNTER SYMPTOMS
CHOKING: 0
CONSTIPATION: 0
COUGH: 0
TROUBLE SWALLOWING: 0
ACTIVITY CHANGE: 0
BLOOD IN STOOL: 0
SLEEP DISTURBANCE: 0
HALLUCINATIONS: 0
SEIZURES: 0

## 2024-10-03 NOTE — PROGRESS NOTES
Outpatient Adult IDD Psychiatry    A HIPAA-compliant interactive audio and video telecommunication system which permits real time communications between the patient (at the originating site) and provider (at the distant site) was utilized to provide this telehealth service.     The patient, family, caregivers and guardian (as appropriate) have provided consent to conduct treatment via this telehealth service.  The patient's identity and physical location were verified at the time of this visit.      Present for appointment: Jessica and caregiver (Jonathon).    SUBJECTIVE    Jessica developed a facial rash about 3 weeks after starting the benztropine, so it was discontinued out of an abundance of caution.  At this time she has been off the benztropine for about two months and the rash has still not fully resolved.  She will have follow-up with her PCP in December.  Despite no longer taking the benztropine, she really hasn't had any marked increase in episodes of apparent abnormal eye movements.  She and her sister just started working with a new caregiver/provider.  She still gets upset/irritable/oppositional at times.  She does not seem to be sad or withdrawn.  She has generally been doing well at the day program.  She does not appear to be internally stimulated or interacting with hallucinations.  She seems to be sleeping well.  PCP wanted her back on the metformin, which she is tolerating.  She does not have any drooling or dysphagia.  She has not had any dizziness, falls, or other gait problems.  She has not had any apparent seizures or spells.     Review of Systems   Constitutional:  Negative for activity change.   HENT:  Negative for drooling and trouble swallowing.    Respiratory:  Negative for cough and choking.    Gastrointestinal:  Negative for blood in stool and constipation.   Musculoskeletal:  Negative for gait problem.   Skin:  Positive for rash.   Neurological:  Negative for seizures and syncope.    Psychiatric/Behavioral:  Negative for hallucinations and sleep disturbance.       Controlled Substance Evaluation  OARRS/PDMP reviewed: Jean Claude Patel MD on 10/3/2024  7:46 PM  Is the patient prescribed a combination of a benzodiazepine and opioid? No  I have personally reviewed the OARRS report for Jessica Shrestha.   I have considered the risks of abuse, dependence, addiction and diversion.    I believe that it is clinically appropriate for Jessica Shrestha to be prescribed this medication.    Last Urine Drug Screen: 06/01/2023  No results found for this or any previous visit (from the past 8760 hour(s)).    Controlled Substance Agreement: 04/11/2024  Reviewed Controlled Substance Agreement, including but not limited to:  Benefits, risks, and alternatives to treatment with a controlled substance medication(s).    Prescribed Controlled Substances:  > Benzodiazepines: Lorazepam  What is the patient's goal of therapy? Improved sleep/mood  Is this being achieved with current treatment? Yes  Activities of Daily Living:   Is your overall impression that this patient is benefiting (symptom reduction outweighs side effects) from benzodiazepine therapy? Yes   1. Physical Functioning: Same  2. Family Relationship: Same  3. Social Relationship: Same  4. Mood: Same  5. Sleep Patterns: Same  6. Overall Function: Same     MEDICATION HISTORY  Aripiprazole - weight gain  Clonazepam - worse response, too sedating  Fluoxetine - some improvement in mood  Trintellix - minimal benefit  Ziprasidone - too sedating    CURRENT MEDICATIONS    Current Outpatient Medications:     metFORMIN (Glucophage) 500 mg tablet, Take 1 tablet (500 mg) by mouth 2 times daily (morning and late afternoon)., Disp: , Rfl:     aspirin 81 mg EC tablet, Take 1 tablet (81 mg) by mouth once daily at bedtime., Disp: , Rfl:     atorvastatin (Lipitor) 40 mg tablet, Take 1 tablet (40 mg) by mouth once daily., Disp: , Rfl:     DULoxetine (Cymbalta) 30 mg   capsule, Take 1 capsule (30 mg) by mouth once daily., Disp: 30 capsule, Rfl: 11    DULoxetine (Cymbalta) 60 mg DR capsule, Take 1 capsule (60 mg) by mouth once daily., Disp: 30 capsule, Rfl: 11    levonorgestreL-ethinyl estrad (Seasonale) 0.15 mg-30 mcg (91) tablet, Take 1 tablet by mouth once daily., Disp: , Rfl:     levothyroxine (Synthroid, Levoxyl) 100 mcg tablet, Take 1 tablet (100 mcg) by mouth once daily., Disp: , Rfl:     LORazepam (Ativan) 0.5 mg tablet, Take 1 tablet (0.5 mg) by mouth once daily at bedtime., Disp: 30 tablet, Rfl: 2    lurasidone (Latuda) 40 mg tablet, Take 1 tablet (40 mg) by mouth once daily., Disp: 30 tablet, Rfl: 11    Vitamin B-12 1,000 mcg tablet, Take 1 tablet (1,000 mcg) by mouth once daily., Disp: , Rfl:     SOCIAL HISTORY  Living situation Lives with her caregiver and twin sister   Provider agency N/A   Work or day program [facility] [employer] 5 days/week   School N/A   Guardianship Self currently   [facility] Specialist ?   Nicotine None   Alcohol None   Other drugs None     OBJECTIVE    Lab Results   Component Value Date    HGBA1C 6.3 (H) 11/24/2023     Therapeutic Drug Monitoring  N/A    Electrocardiograms  03/13/2017: NSR, VR 83, QTc 394    MENTAL STATUS EXAM  General/Appearance: Appropriate dress/hygiene/grooming. Dysmorphic features.  Attitude/Behavior: Average eye contact. Superficially cooperative.  Speech/Communication: Nonverbal.  Motor: No abnormal or involuntary movements observed.  Gait: Ambulates w/o difficulty.  Mood: Appears upset/irritable.  Affect: Congruent. Irritable.  Thought processes: Goal-directed.  Thought content: Unable to assess.  Perception: Does not appear to be experiencing or responding to hallucinations.  Sensorium/Cognition: Oriented to self and surroundings. Intellectual impairment. Minimal interest in participating.  Memory: Not directly assessed at this time.  Insight: Absent.  Judgment:  Poor.    ASSESSMENT  It does not seem likely that her facial rash was related to use of benztropine since it has persisted long after stopping the medication.  We will plan to resume the benztropine if EPS becomes more problematic.    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

## 2025-01-10 DIAGNOSIS — T88.7XXA MEDICATION SIDE EFFECT: ICD-10-CM

## 2025-01-10 DIAGNOSIS — F39 MOOD DISORDER (CMS-HCC): ICD-10-CM

## 2025-01-10 RX ORDER — LORAZEPAM 0.5 MG/1
0.5 TABLET ORAL NIGHTLY
Qty: 28 TABLET | Refills: 0 | Status: SHIPPED | OUTPATIENT
Start: 2025-01-10

## 2025-01-10 RX ORDER — BENZTROPINE MESYLATE 0.5 MG/1
0.5 TABLET ORAL DAILY PRN
Qty: 30 TABLET | Refills: 0 | Status: SHIPPED | OUTPATIENT
Start: 2025-01-10 | End: 2025-04-10

## 2025-01-10 RX ORDER — DOCUSATE SODIUM 100 MG/1
100 CAPSULE, LIQUID FILLED ORAL 2 TIMES DAILY
COMMUNITY
Start: 2025-01-03

## 2025-01-16 ENCOUNTER — APPOINTMENT (OUTPATIENT)
Dept: BEHAVIORAL HEALTH | Facility: CLINIC | Age: 40
End: 2025-01-16
Payer: MEDICARE

## 2025-01-16 DIAGNOSIS — F39 MOOD DISORDER (CMS-HCC): ICD-10-CM

## 2025-01-16 DIAGNOSIS — F70 MILD INTELLECTUAL DISABILITY: ICD-10-CM

## 2025-01-16 DIAGNOSIS — F33.41 RECURRENT MAJOR DEPRESSIVE DISORDER, IN PARTIAL REMISSION (CMS-HCC): Primary | ICD-10-CM

## 2025-01-16 DIAGNOSIS — T88.7XXA MEDICATION SIDE EFFECT: ICD-10-CM

## 2025-01-16 DIAGNOSIS — Z79.899 HIGH RISK MEDICATION USE: ICD-10-CM

## 2025-01-16 DIAGNOSIS — Q90.9 DOWN SYNDROME, UNSPECIFIED (HHS-HCC): ICD-10-CM

## 2025-01-16 PROBLEM — R62.50 DEVELOPMENTAL REGRESSION: Status: RESOLVED | Noted: 2023-01-03 | Resolved: 2025-01-16

## 2025-01-16 PROCEDURE — 1036F TOBACCO NON-USER: CPT | Performed by: PSYCHIATRY & NEUROLOGY

## 2025-01-16 PROCEDURE — 99215 OFFICE O/P EST HI 40 MIN: CPT | Performed by: PSYCHIATRY & NEUROLOGY

## 2025-01-16 PROCEDURE — G2211 COMPLEX E/M VISIT ADD ON: HCPCS | Performed by: PSYCHIATRY & NEUROLOGY

## 2025-01-16 RX ORDER — BENZTROPINE MESYLATE 0.5 MG/1
1 TABLET ORAL 2 TIMES DAILY PRN
Qty: 30 TABLET | Refills: 0 | Status: SHIPPED | OUTPATIENT
Start: 2025-01-16 | End: 2025-04-16

## 2025-01-16 RX ORDER — LORAZEPAM 0.5 MG/1
0.5 TABLET ORAL NIGHTLY
Qty: 28 TABLET | Refills: 3 | Status: SHIPPED | OUTPATIENT
Start: 2025-01-16

## 2025-01-16 ASSESSMENT — ENCOUNTER SYMPTOMS
SEIZURES: 0
TROUBLE SWALLOWING: 0
COUGH: 0
SLEEP DISTURBANCE: 0
ACTIVITY CHANGE: 0
CONSTIPATION: 0
HALLUCINATIONS: 0
CHOKING: 0
BLOOD IN STOOL: 0

## 2025-01-16 NOTE — PROGRESS NOTES
Outpatient Adult IDD Psychiatry    A HIPAA-compliant interactive audio and video telecommunication system which permits real time communications between the patient (at the originating site) and provider (at the distant site) was utilized to provide this telehealth service.     The patient, family, caregivers and guardian (as appropriate) have provided consent to conduct treatment via this telehealth service.      The patient's identity and physical location were verified at the time of this visit.     Present for appointment: Jessica and caregiver (Jonathon).    Appointment location: Home.  [address] [place], [place] [id number]     SUBJECTIVE    Last visit with Jessica was 10/03/24.    Had updated labs done in October and was re-started on metformin 500mg every morning as of 10/14/24.  She had her Medicare AWV done on 12/13/24.    She's been more oppositional at day program lately.  Jonathon feels like sometimes she is excessively tired in the morning, despite apparently getting a pretty decent night of sleep.  Likes going out with her provider, does not refuse or act up during those visits.  She does not appear to be internally stimulated or interacting with hallucinations.      She had one recent (possible?) dystonic episode where she was looking up at the ceiling for a prolonged period of time.  A one-time dose of benztropine 0.5mg did not help at all, but a dose of 0.5mg lorazepam did help her calm down after some time.  This was the only such episode she's had over the past three months.    She does not have any drooling or dysphagia.  She has not had any dizziness, falls, or other gait problems.  She has not had any apparent seizures or spells.     Review of Systems   Constitutional:  Negative for activity change.   HENT:  Negative for drooling and trouble swallowing.    Respiratory:  Negative for cough and choking.    Gastrointestinal:  Negative for blood in stool and constipation.   Musculoskeletal:  Negative for gait  problem.   Neurological:  Negative for seizures and syncope.   Psychiatric/Behavioral:  Positive for behavioral problems. Negative for hallucinations and sleep disturbance.       Controlled Substance Evaluation  OARRS/PDMP reviewed: Jean Claude Patel MD on 1/16/2025  6:48 AM  Is the patient prescribed a combination of a benzodiazepine and opioid? No  I have personally reviewed the OARRS report for Jessica Shrestha.   I have considered the risks of abuse, dependence, addiction and diversion.    I believe that it is clinically appropriate for Jessica Shrestha to be prescribed this medication.    Last Urine Drug Screen: 06/01/2023  No results found for this or any previous visit (from the past 8760 hours).    Controlled Substance Agreement: 04/11/2024  Reviewed Controlled Substance Agreement, including but not limited to:  Benefits, risks, and alternatives to treatment with a controlled substance medication(s).    Prescribed Controlled Substances:  > Benzodiazepines: Lorazepam  What is the patient's goal of therapy? Improved sleep/mood  Is this being achieved with current treatment? Yes  Activities of Daily Living:   Is your overall impression that this patient is benefiting (symptom reduction outweighs side effects) from benzodiazepine therapy? Yes   1. Physical Functioning: Same  2. Family Relationship: Same  3. Social Relationship: Same  4. Mood: Same  5. Sleep Patterns: Same  6. Overall Function: Same     MEDICATION HISTORY  Aripiprazole - weight gain  Clonazepam - worse response, too sedating  Fluoxetine - some improvement in mood  Trintellix - minimal benefit  Ziprasidone - too sedating    CURRENT MEDICATIONS    Current Outpatient Medications:     aspirin 81 mg EC tablet, Take 1 tablet (81 mg) by mouth once daily at bedtime., Disp: , Rfl:     atorvastatin (Lipitor) 40 mg tablet, Take 1 tablet (40 mg) by mouth once daily., Disp: , Rfl:     benztropine (Cogentin) 0.5 mg tablet, Take 1 tablet (0.5 mg) by mouth  once daily as needed (abnormal movements)., Disp: 30 tablet, Rfl: 0    docusate sodium (Colace) 100 mg capsule, Take 1 capsule (100 mg) by mouth 2 times a day., Disp: , Rfl:     DULoxetine (Cymbalta) 30 mg DR capsule, Take 1 capsule (30 mg) by mouth once daily., Disp: 30 capsule, Rfl: 11    DULoxetine (Cymbalta) 60 mg DR capsule, Take 1 capsule (60 mg) by mouth once daily., Disp: 30 capsule, Rfl: 11    levonorgestreL-ethinyl estrad (Seasonale) 0.15 mg-30 mcg (91) tablet, Take 1 tablet by mouth once daily., Disp: , Rfl:     levothyroxine (Synthroid, Levoxyl) 100 mcg tablet, Take 1 tablet (100 mcg) by mouth once daily., Disp: , Rfl:     LORazepam (Ativan) 0.5 mg tablet, Take 1 tablet (0.5 mg) by mouth once daily at bedtime., Disp: 28 tablet, Rfl: 0    lurasidone (Latuda) 40 mg tablet, Take 1 tablet (40 mg) by mouth once daily., Disp: 30 tablet, Rfl: 11    metFORMIN (Glucophage) 500 mg tablet, Take 1 tablet (500 mg) by mouth 2 times daily (morning and late afternoon)., Disp: , Rfl:     Vitamin B-12 1,000 mcg tablet, Take 1 tablet (1,000 mcg) by mouth once daily., Disp: , Rfl:     SOCIAL HISTORY  Living situation Lives with her caregiver and twin sister   Provider agency N/A   Work or day program [employer] 5 days/week   School N/A   Guardianship Self currently   [facility]   Bx Specialist ?   Nicotine None   Alcohol None   Other drugs None     OBJECTIVE    Lab Results   Component Value Date    HGBA1C 6.4 (H) 10/07/2024     Therapeutic Drug Monitoring  N/A    Electrocardiograms  03/13/2017: NSR, VR 83, QTc 394    MENTAL STATUS EXAM  General/Appearance: Appropriate dress/hygiene/grooming.  Attitude/Behavior: Difficult to engage. Hostile.  Speech/Communication: Nonverbal.  Motor: No abnormal or involuntary movements observed.  Gait: Unable to assess.  Mood: Appears upset/irritable.  Affect: Unable to assess.  Thought processes: Goal-directed.  Thought content: Unable to  assess.  Perception: Unable to assess.  Sensorium/Cognition: Oriented to self and surroundings. Intellectual impairment. Minimal interest in participating.  Memory: Not directly assessed at this time.  Insight: Absent.  Judgment: Poor.    ASSESSMENT  Recommend moving the entire dose of duloxetine (90mg) back to bedtime to see if this helps reduce early daytime sedation at day program.  We could possibly consider a trial of add-on bupropion or modafinil as a next step.  Instructed caregiver to use a full 1mg of benztropine if Jessica has another possible dystonic episode (can repeat with another full 1mg of benztropine or 0.5mg of lorazepam as needed).    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    PLAN  -- Continue duloxetine 90mg daily in the evening for depression  -- Continue Latuda 40mg daily in the afternoon for depression  -- Continue lorazepam 0.5mg at bedtime for anxiety and irritability   -- PRN: benztropine 1mg to 2mg daily as needed for dystonia  -- Follow up in-office in 3 months    Jean Claude Patel MD    Prep time on date of the patient encounter: 0 minutes   Time spent directly with patient/family/caregiver: 45 minutes   Additional time spent on patient care activities: 0 minutes   Documentation time: 5 minutes   Other time spent: 0 minutes   Total time on date of patient encounter: 35 minutes

## 2025-04-24 ENCOUNTER — APPOINTMENT (OUTPATIENT)
Dept: BEHAVIORAL HEALTH | Facility: CLINIC | Age: 40
End: 2025-04-24
Payer: MEDICARE

## 2025-04-24 VITALS
WEIGHT: 163.56 LBS | DIASTOLIC BLOOD PRESSURE: 89 MMHG | HEART RATE: 111 BPM | TEMPERATURE: 98 F | SYSTOLIC BLOOD PRESSURE: 119 MMHG

## 2025-04-24 DIAGNOSIS — F33.41 RECURRENT MAJOR DEPRESSIVE DISORDER, IN PARTIAL REMISSION (CMS-HCC): Primary | ICD-10-CM

## 2025-04-24 DIAGNOSIS — Q90.9 DOWN SYNDROME, UNSPECIFIED (HHS-HCC): ICD-10-CM

## 2025-04-24 DIAGNOSIS — F70 MILD INTELLECTUAL DISABILITY: ICD-10-CM

## 2025-04-24 PROCEDURE — 3074F SYST BP LT 130 MM HG: CPT | Performed by: PSYCHIATRY & NEUROLOGY

## 2025-04-24 PROCEDURE — 99215 OFFICE O/P EST HI 40 MIN: CPT | Performed by: PSYCHIATRY & NEUROLOGY

## 2025-04-24 PROCEDURE — G2211 COMPLEX E/M VISIT ADD ON: HCPCS | Performed by: PSYCHIATRY & NEUROLOGY

## 2025-04-24 PROCEDURE — 3079F DIAST BP 80-89 MM HG: CPT | Performed by: PSYCHIATRY & NEUROLOGY

## 2025-04-24 PROCEDURE — 1036F TOBACCO NON-USER: CPT | Performed by: PSYCHIATRY & NEUROLOGY

## 2025-04-24 RX ORDER — LORAZEPAM 0.5 MG/1
0.5 TABLET ORAL NIGHTLY
Qty: 30 TABLET | Refills: 2 | Status: SHIPPED | OUTPATIENT
Start: 2025-04-24

## 2025-04-24 RX ORDER — DULOXETIN HYDROCHLORIDE 30 MG/1
30 CAPSULE, DELAYED RELEASE ORAL DAILY
Qty: 30 CAPSULE | Refills: 11 | Status: SHIPPED | OUTPATIENT
Start: 2025-04-24

## 2025-04-24 RX ORDER — DULOXETIN HYDROCHLORIDE 60 MG/1
60 CAPSULE, DELAYED RELEASE ORAL NIGHTLY
Qty: 30 CAPSULE | Refills: 11 | Status: SHIPPED | OUTPATIENT
Start: 2025-04-24

## 2025-04-24 RX ORDER — LURASIDONE HYDROCHLORIDE 40 MG/1
40 TABLET, FILM COATED ORAL
Qty: 30 TABLET | Refills: 11 | Status: SHIPPED | OUTPATIENT
Start: 2025-04-24

## 2025-04-24 ASSESSMENT — ENCOUNTER SYMPTOMS
SEIZURES: 0
TROUBLE SWALLOWING: 0
SLEEP DISTURBANCE: 0
ACTIVITY CHANGE: 0
CONSTIPATION: 0
BLOOD IN STOOL: 0
CHOKING: 0
HALLUCINATIONS: 0
COUGH: 0

## 2025-04-24 NOTE — PROGRESS NOTES
Outpatient Adult IDD Psychiatry    Present for appointment: Jessica and caregiver (Jonathon).    SUBJECTIVE    Jessica has not really had any concerning health issues over the past few months.    Reviewed labs done on 3/08/25: CBCD showing persistently elevated WBC and PLT, CMP normal, TSH normal, TGS = 181, A1c = 6.5.    She will be due to see ENT next month for ear cleaning.    She has been attending day program consistently without resistance or refusals.    Goes out with independent provider on a regular basis.    Will be going over to spend time with mom this coming weekend.    Loves going bowling.  Sometimes will walk at the [facility] center with her provider.    Doesn't seem to have lost interest in things she enjoys.    Sleeping really well at night.  Less morning/daytime sleeping at day program reported.    Very food-motivated.  Occasional loose stools requiring use of as-needed loperamide.    No drooling, choking, or gagging noted.    No recent use of as-needed lorazepam or benztropine for either agitation or apparent dystonic episodes.    No tremors or other abnormal involuntary movements.    She has not had any dizziness, falls, or other gait problems.  She has not had any apparent seizures or spells.    No reported regression or loss of functional skills or abilities.     Review of Systems   Constitutional:  Negative for activity change.   HENT:  Negative for drooling and trouble swallowing.    Respiratory:  Negative for cough and choking.    Gastrointestinal:  Negative for blood in stool and constipation.   Musculoskeletal:  Negative for gait problem.   Neurological:  Negative for seizures and syncope.   Psychiatric/Behavioral:  Positive for behavioral problems. Negative for hallucinations and sleep disturbance.       Controlled Substance Evaluation  OARRS/PDMP reviewed: Jean Claude Patel MD on 4/24/2025  6:57 AM  Is the patient prescribed a combination of a benzodiazepine and opioid? No  I have personally  reviewed the OARRS report for Jessica Shrestha.   I have considered the risks of abuse, dependence, addiction and diversion.    I believe that it is clinically appropriate for Jessica Shrestha to be prescribed this medication.    Last Urine Drug Screen: Unable to obtain  No results found for this or any previous visit (from the past 8760 hours).    Controlled Substance Agreement: 04/24/2025  Reviewed Controlled Substance Agreement, including but not limited to:  Benefits, risks, and alternatives to treatment with a controlled substance medication(s).    Prescribed Controlled Substances:  > Benzodiazepines: Lorazepam  What is the patient's goal of therapy? Improved sleep/mood  Is this being achieved with current treatment? Yes  Activities of Daily Living:   Is your overall impression that this patient is benefiting (symptom reduction outweighs side effects) from benzodiazepine therapy? Yes   1. Physical Functioning: Same  2. Family Relationship: Same  3. Social Relationship: Same  4. Mood: Same  5. Sleep Patterns: Same  6. Overall Function: Same     MEDICATION HISTORY  Aripiprazole - weight gain  Clonazepam - worse response, too sedating  Fluoxetine - some improvement in mood  Trintellix - minimal benefit  Ziprasidone - too sedating    CURRENT MEDICATIONS  Medications Prior to Visit[1]    SOCIAL HISTORY  Living situation Lives with her caregiver and twin sister   Provider agency N/A   Work or day program [employer] 5 days/week   School N/A   Guardianship Self currently   SSA [facility] Specialist ?   Nicotine None   Alcohol None   Other drugs None     OBJECTIVE    Visit Vitals  /89   Pulse (!) 111   Temp 36.7 °C (98 °F)   Wt 74.2 kg (163 lb 9 oz)   Smoking Status Never     Lab Results   Component Value Date    HGBA1C 6.5 (H) 03/08/2025     Therapeutic Drug Monitoring  N/A    Electrocardiograms  03/13/2017: NSR, VR 83, QTc 394    MENTAL STATUS  "EXAM  General/Appearance: Appropriate dress/hygiene/grooming.  Attitude/Behavior: Difficult to engage. Poor/limited eye contact. Sits cross-legged on the chair. Very frequently turns her head away from caregiver, squeezes her eyes shut, wrinkles her brow and protrudes her tongue. Pounds on her thigh a few times. Rubs or tries to pick at her right ear canal. Happily draws a picture with crayons while Jonathon and I talk.  Speech/Communication: Speaks in full sentences. Often intentionally mute. Some dysarthria when talking.  Motor: No abnormal or involuntary movements observed. Repetitive voluntary tongue protrusion.  Gait: Ambulates w/o difficulty. Stable/steady.  Mood: Appears upset/irritable. However, gets markedly more happy/excited when it's time to leave and go get lunch.  Affect: Dysphoric. Full range. Unable to assess.  Thought processes: Goal-directed.  Thought content: Unable to assess.  Perception:  Mouthing words to herself when she turns her head and closes her eyes.  Sensorium/Cognition: Oriented to self and surroundings. Intellectual impairment. Minimal interest in participating.  Memory: Not directly assessed at this time.  Insight: Absent.  Judgment: Poor.    ASSESSMENT  Jessica has mostly been stable since we last met.  I don't think these brief but frequent \"episodes\" seen in the office today are either related to psychosis or any type of medication side effect as implied by caregiver.  These seem much more consistent with voluntary periods of oppositional behavior, especially given the absence of any disorganized thinking or behaviors and the rapidity with which her mood changes when it comes time to wrap up the visit.  I don't see a need for any additional medication changes at this time.    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    PLAN  -- Continue " duloxetine 30mg every morning and 60mg at bedtime for depression  -- Continue Latuda 40mg daily in the evening for depression  -- Continue lorazepam 0.5mg at bedtime for anxiety and irritability   -- PRN: benztropine 1mg to 2mg daily as needed for dystonia  -- Follow up 3 months    Jean Claude Patel MD    Prep time on date of the patient encounter: 5 minutes   Time spent directly with patient/family/caregiver: 45 minutes   Additional time spent on patient care activities: 0 minutes   Documentation time: 10 minutes   Other time spent: 0 minutes   Total time on date of patient encounter: 60 minutes          [1]   Outpatient Medications Prior to Visit   Medication Sig Dispense Refill    aspirin 81 mg EC tablet Take 1 tablet (81 mg) by mouth once daily at bedtime.      atorvastatin (Lipitor) 40 mg tablet Take 1 tablet (40 mg) by mouth once daily.      benztropine (Cogentin) 0.5 mg tablet Take 2 tablets (1 mg) by mouth 2 times a day as needed (abnormal movements). 30 tablet 0    docusate sodium (Colace) 100 mg capsule Take 1 capsule (100 mg) by mouth 2 times a day.      DULoxetine (Cymbalta) 30 mg DR capsule Take 1 capsule (30 mg) by mouth once daily. 30 capsule 11    DULoxetine (Cymbalta) 60 mg DR capsule Take 1 capsule (60 mg) by mouth once daily. 30 capsule 11    levonorgestreL-ethinyl estrad (Seasonale) 0.15 mg-30 mcg (91) tablet Take 1 tablet by mouth once daily.      levothyroxine (Synthroid, Levoxyl) 100 mcg tablet Take 1 tablet (100 mcg) by mouth once daily.      LORazepam (Ativan) 0.5 mg tablet Take 1 tablet (0.5 mg) by mouth once daily at bedtime. 28 tablet 3    lurasidone (Latuda) 40 mg tablet Take 1 tablet (40 mg) by mouth once daily. 30 tablet 11    metFORMIN (Glucophage) 500 mg tablet Take 1 tablet (500 mg) by mouth 2 times daily (morning and late afternoon).      Vitamin B-12 1,000 mcg tablet Take 1 tablet (1,000 mcg) by mouth once daily.       No facility-administered medications prior to visit.

## 2025-07-24 ENCOUNTER — APPOINTMENT (OUTPATIENT)
Dept: BEHAVIORAL HEALTH | Facility: CLINIC | Age: 40
End: 2025-07-24
Payer: MEDICARE

## 2025-07-24 DIAGNOSIS — F33.41 RECURRENT MAJOR DEPRESSIVE DISORDER, IN PARTIAL REMISSION: Primary | ICD-10-CM

## 2025-07-24 DIAGNOSIS — F70 MILD INTELLECTUAL DISABILITY: ICD-10-CM

## 2025-07-24 DIAGNOSIS — Q90.9 DOWN SYNDROME, UNSPECIFIED (HHS-HCC): ICD-10-CM

## 2025-07-24 PROCEDURE — 1036F TOBACCO NON-USER: CPT | Performed by: PSYCHIATRY & NEUROLOGY

## 2025-07-24 PROCEDURE — G2211 COMPLEX E/M VISIT ADD ON: HCPCS | Performed by: PSYCHIATRY & NEUROLOGY

## 2025-07-24 PROCEDURE — 99215 OFFICE O/P EST HI 40 MIN: CPT | Performed by: PSYCHIATRY & NEUROLOGY

## 2025-07-24 RX ORDER — LORAZEPAM 0.5 MG/1
0.5 TABLET ORAL NIGHTLY
Qty: 30 TABLET | Refills: 2 | Status: SHIPPED | OUTPATIENT
Start: 2025-07-24

## 2025-07-24 ASSESSMENT — ENCOUNTER SYMPTOMS
COUGH: 0
SLEEP DISTURBANCE: 0
HALLUCINATIONS: 0
SEIZURES: 0
CHOKING: 0
TROUBLE SWALLOWING: 0
CONSTIPATION: 0
BLOOD IN STOOL: 0
ACTIVITY CHANGE: 0

## 2025-07-24 NOTE — PROGRESS NOTES
"Outpatient Adult IDD Psychiatry    A HIPAA-compliant interactive audio and video telecommunication system which permits real time communications between the patient (at the originating site) and provider (at the distant site) was utilized to provide this telehealth service.     The patient, family, caregivers and guardian (as appropriate) have provided consent to conduct treatment via this telehealth service.      The patient's identity and physical location were verified at the time of this visit.     Present for appointment: Jessica and caregiver (Jonathon).    Appointment location: Home.  [place], [place]    SUBJECTIVE    Jessica has not really had any concerning health issues over the past few months.    She will be due to have repeat labs (A1c) done in August.    She seems to be in a good mood most of the time at home.    She attends day program consistently.    Jonathon notes that there have been increased reports from day program about her having bowel accidents in her clothing (possibly/probably intention) which seem to correlate with days that they have physical activities planned that she is not interested in.    She and her sister go out with their independent provider on a regular basis (Monday through Thursday afternoons).    Doesn't seem to have lost interest in things she enjoys.  Likes bowling.  Likes \"anything related to food\" per Jonathon.    Sleeping really well at night.  Less morning/daytime sleeping at day program reported.    Very food-motivated.  Occasional loose stools requiring use of as-needed loperamide.  Very rarely does she ever have bowel accidents when at home.    No drooling, choking, or gagging noted.    No recent use of as-needed lorazepam or benztropine for either agitation or apparent dystonic episodes.    No tremors or other abnormal involuntary movements.    She has not had any dizziness, falls, or other gait problems.  She has not had any apparent seizures or spells.    No reported regression or " loss of functional skills or abilities.     Review of Systems   Constitutional:  Negative for activity change.   HENT:  Negative for drooling and trouble swallowing.    Respiratory:  Negative for cough and choking.    Gastrointestinal:  Negative for blood in stool and constipation.   Musculoskeletal:  Negative for gait problem.   Neurological:  Negative for seizures and syncope.   Psychiatric/Behavioral:  Positive for behavioral problems. Negative for hallucinations and sleep disturbance.         Controlled Substance Evaluation  OARRS/PDMP reviewed: Jean Claude Patel MD on 7/24/2025  7:14 AM  Is the patient prescribed a combination of a benzodiazepine and opioid? No  I have personally reviewed the OARRS report for Jessica Shrestha.   I have considered the risks of abuse, dependence, addiction and diversion.    I believe that it is clinically appropriate for Jessica Shrestha to be prescribed this medication.    Last Urine Drug Screen: Unable to obtain  No results found for this or any previous visit (from the past 8760 hours).    Controlled Substance Agreement: 04/24/2025  Reviewed Controlled Substance Agreement, including but not limited to:  Benefits, risks, and alternatives to treatment with a controlled substance medication(s).    Prescribed Controlled Substances:  > Benzodiazepines: Lorazepam  What is the patient's goal of therapy? Improved sleep/mood  Is this being achieved with current treatment? Yes  Activities of Daily Living:   Is your overall impression that this patient is benefiting (symptom reduction outweighs side effects) from benzodiazepine therapy? Yes   1. Physical Functioning: Same  2. Family Relationship: Same  3. Social Relationship: Same  4. Mood: Same  5. Sleep Patterns: Same  6. Overall Function: Same     MEDICATION HISTORY  Aripiprazole - weight gain  Clonazepam - worse response, too sedating  Fluoxetine - some improvement in mood  Trintellix - minimal benefit  Ziprasidone - too  "sedating    CURRENT MEDICATIONS  Medications Prior to Visit[1]    SOCIAL HISTORY  Living situation Lives with her caregiver and twin sister   Provider agency N/A   Work or day program [facility] [employer] [employer] 5 days/week   School N/A   Guardianship Self currently   [facility] Special[facility] N/A   Nicotine None/never   Alcohol None/never   Other drugs None/never     OBJECTIVE    Therapeutic Drug Monitoring  N/A    Electrocardiograms  03/13/2017: NSR, VR 83, QTc 394    MENTAL STATUS EXAM  General/Appearance: Appropriate dress/hygiene/grooming.  Attitude/Behavior: Difficult to engage. Poor/limited eye contact. Puts herself on the floor face-down and refuses to interact.  Speech/Communication: No expressive language today.  Motor: No abnormal or involuntary movements observed.  Gait: Ambulates w/o difficulty.  Mood: Unable to assess.  Affect: Irritable.  Thought processes: Goal-directed.  Thought content: Unable to assess.  Perception: Unable to assess.  Sensorium/Cognition: Oriented to self and surroundings. Intellectual impairment. Minimal interest in participating.  Memory: Not directly assessed at this time.  Insight: Absent.  Judgment: Poor.    ASSESSMENT  Jessica has mostly been stable and appears to be at her usual or typical \"baseline\" in terms of her overall psychiatric/behavioral presentation.  No current indication to make treatment adjustments.    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  Documenting clinical information: 5 minutes.  TOTAL TIME SPENT: 45 minutes.         [1]   Outpatient Medications Prior to Visit   Medication Sig Dispense Refill    aspirin 81 mg EC tablet Take 1 tablet (81 mg) by mouth once daily at bedtime.      atorvastatin (Lipitor) 40 mg tablet Take 1 tablet (40 mg) by mouth once daily.      benztropine (Cogentin) 0.5 mg tablet Take 2 tablets (1 mg) by mouth 2 times a day as needed (abnormal movements). 30 tablet 0    docusate sodium (Colace) 100 mg capsule Take 1 capsule (100 mg) by mouth 2 times a day.      DULoxetine (Cymbalta) 30 mg DR capsule Take 1 capsule (30 mg) by mouth once daily. 30 capsule 11    DULoxetine (Cymbalta) 60 mg DR capsule Take 1 capsule (60 mg) by mouth once daily at bedtime. 30 capsule 11    levonorgestreL-ethinyl estrad (Seasonale) 0.15 mg-30 mcg (91) tablet Take 1 tablet by mouth once daily.      levothyroxine (Synthroid, Levoxyl) 100 mcg tablet Take 1 tablet (100 mcg) by mouth once daily.      LORazepam (Ativan) 0.5 mg tablet Take 1 tablet (0.5 mg) by mouth once daily at bedtime. 30 tablet 2    lurasidone (Latuda) 40 mg tablet Take 1 tablet (40 mg) by mouth once daily in the evening. Take with meals. 30 tablet 11    metFORMIN (Glucophage) 500 mg tablet Take 1 tablet (500 mg) by mouth 2 times daily (morning and late afternoon).      Vitamin B-12 1,000 mcg tablet Take 1 tablet (1,000 mcg) by mouth once daily.       No facility-administered medications prior to visit.

## 2025-10-30 ENCOUNTER — APPOINTMENT (OUTPATIENT)
Dept: BEHAVIORAL HEALTH | Facility: CLINIC | Age: 40
End: 2025-10-30
Payer: COMMERCIAL